# Patient Record
Sex: MALE | Race: WHITE
[De-identification: names, ages, dates, MRNs, and addresses within clinical notes are randomized per-mention and may not be internally consistent; named-entity substitution may affect disease eponyms.]

---

## 2018-12-18 ENCOUNTER — HOSPITAL ENCOUNTER (EMERGENCY)
Dept: HOSPITAL 88 - ER | Age: 43
Discharge: HOME | End: 2018-12-18
Payer: SELF-PAY

## 2018-12-18 VITALS — WEIGHT: 198 LBS | HEIGHT: 70 IN | BODY MASS INDEX: 28.35 KG/M2

## 2018-12-18 VITALS — SYSTOLIC BLOOD PRESSURE: 135 MMHG | DIASTOLIC BLOOD PRESSURE: 96 MMHG
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
## 2018-12-18 DIAGNOSIS — I50.9: ICD-10-CM

## 2018-12-18 DIAGNOSIS — S60.222A: Primary | ICD-10-CM

## 2018-12-18 DIAGNOSIS — I48.91: ICD-10-CM
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
## 2018-12-18 PROCEDURE — 99283 EMERGENCY DEPT VISIT LOW MDM: CPT

## 2018-12-18 NOTE — DIAGNOSTIC IMAGING REPORT
Hand Complete



CPT code: 01453



Indication:Impaction injury to middle of hand



Technique: Three views of the left hand obtained



Comparison: None.



Findings:



Distal radius and ulna appear intact. There is mild negative ulnar variance.

Carpal bones appear generally well aligned. The digits are intact and normal in

morphology.



There is diffuse soft tissue swelling of the dorsum of the hand without

radiopaque foreign body.



IMPRESSION:



No evidence for displaced fracture or current dislocation of the hand.



Signed by: Dr. Carolyn Laura MD on 12/18/2018 5:26 PM